# Patient Record
Sex: MALE | Race: WHITE | NOT HISPANIC OR LATINO | Employment: FULL TIME | URBAN - METROPOLITAN AREA
[De-identification: names, ages, dates, MRNs, and addresses within clinical notes are randomized per-mention and may not be internally consistent; named-entity substitution may affect disease eponyms.]

---
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
## 2020-03-16 ENCOUNTER — OFFICE VISIT (OUTPATIENT)
Dept: URGENT CARE | Facility: CLINIC | Age: 20
End: 2020-03-16
Payer: MEDICAID

## 2020-03-16 VITALS — BODY MASS INDEX: 30.45 KG/M2 | WEIGHT: 194 LBS | HEIGHT: 67 IN | RESPIRATION RATE: 16 BRPM

## 2020-03-16 DIAGNOSIS — S91.209A AVULSION OF TOENAIL, INITIAL ENCOUNTER: Primary | ICD-10-CM

## 2020-03-16 PROCEDURE — 99203 OFFICE O/P NEW LOW 30 MIN: CPT | Mod: S$GLB,,, | Performed by: FAMILY MEDICINE

## 2020-03-16 PROCEDURE — 99203 PR OFFICE/OUTPT VISIT, NEW, LEVL III, 30-44 MIN: ICD-10-PCS | Mod: S$GLB,,, | Performed by: FAMILY MEDICINE

## 2020-03-16 RX ORDER — HYDROCODONE BITARTRATE AND ACETAMINOPHEN 5; 325 MG/1; MG/1
1 TABLET ORAL EVERY 6 HOURS PRN
Qty: 15 TABLET | Refills: 0 | Status: SHIPPED | OUTPATIENT
Start: 2020-03-16 | End: 2023-03-10

## 2020-03-16 NOTE — PROCEDURES
Nail Removal  Date/Time: 3/16/2020 12:35 PM  Performed by: George Pitts MD  Authorized by: George Pitts MD     Consent Done?:  Yes (Verbal)    Location:  Left foot  Anesthesia:  Digital block  Anesthetic total (ml):  3  Preparation:  Skin prepped with alcohol    Amount removed:  Complete  Wedge excision of skin of nail fold: No    Nail bed sutured?: No    Nail matrix removed:  None  Dressing applied:  Non-adhesive packing strip, dressing applied and 4x4  Patient tolerance:  Patient tolerated the procedure well with no immediate complications     Pt nail partially avulsed on presentation.

## 2020-03-16 NOTE — PROGRESS NOTES
"Subjective:       Patient ID: Magaly Grimm is a 19 y.o. male.    Vitals:  height is 5' 7" (1.702 m) and weight is 88 kg (194 lb). His respiration is 16.     Chief Complaint: Toe Pain    New problem pt states big toe was hit on door, pt states broken same toe in past    Toe Pain    The incident occurred 1 to 3 hours ago. The incident occurred at home. The injury mechanism was a direct blow. The pain is present in the right foot. The quality of the pain is described as aching. The pain has been constant since onset. Associated symptoms include an inability to bear weight. He reports no foreign bodies present. The symptoms are aggravated by movement and weight bearing. He has tried nothing for the symptoms. The treatment provided no relief.       Constitution: Negative for fatigue.   HENT: Negative for facial swelling and facial trauma.    Neck: Negative for neck stiffness.   Cardiovascular: Negative for chest trauma.   Eyes: Negative for eye trauma, double vision and blurred vision.   Gastrointestinal: Negative for abdominal trauma, abdominal pain and rectal bleeding.   Genitourinary: Negative for hematuria, genital trauma and pelvic pain.   Musculoskeletal: Negative for pain, trauma, joint swelling, abnormal ROM of joint and pain with walking.   Skin: Negative for color change, wound, abrasion and laceration.   Neurological: Negative for dizziness, history of vertigo, light-headedness, coordination disturbances, altered mental status and loss of consciousness.   Hematologic/Lymphatic: Negative for history of bleeding disorder.   Psychiatric/Behavioral: Negative for altered mental status.       Objective:      Physical Exam   Constitutional: He is oriented to person, place, and time. He appears well-developed and well-nourished.   HENT:   Head: Normocephalic and atraumatic.   Nose: Nose normal.   Eyes: Conjunctivae and lids are normal.   Neck: Trachea normal, full passive range of motion without pain and phonation " normal. Neck supple.   Musculoskeletal: Normal range of motion.   Neurological: He is alert and oriented to person, place, and time.   Skin: Skin is warm, dry and intact.   Nail injury- partial avulsion left great toe.    Psychiatric: He has a normal mood and affect. His speech is normal and behavior is normal. Judgment and thought content normal. Cognition and memory are normal.   Nursing note and vitals reviewed.        Assessment:       1. Avulsion of toenail, initial encounter        Plan:         Avulsion of toenail, initial encounter  -     Nail Removal    Other orders  -     HYDROcodone-acetaminophen (NORCO) 5-325 mg per tablet; Take 1 tablet by mouth every 6 (six) hours as needed for Pain.  Dispense: 15 tablet; Refill: 0

## 2020-03-19 ENCOUNTER — TELEPHONE (OUTPATIENT)
Dept: URGENT CARE | Facility: CLINIC | Age: 20
End: 2020-03-19

## 2020-10-08 ENCOUNTER — OFFICE VISIT (OUTPATIENT)
Dept: URGENT CARE | Facility: CLINIC | Age: 20
End: 2020-10-08
Payer: COMMERCIAL
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
## 2020-10-08 DIAGNOSIS — S61.209A FINGER AVULSION, INITIAL ENCOUNTER: ICD-10-CM

## 2020-10-08 DIAGNOSIS — S61.311A LACERATION OF LEFT INDEX FINGER WITH DAMAGE TO NAIL, FOREIGN BODY PRESENCE UNSPECIFIED, INITIAL ENCOUNTER: Primary | ICD-10-CM

## 2020-10-08 PROCEDURE — 12001 LACERATION REPAIR: ICD-10-PCS | Mod: S$GLB,,, | Performed by: FAMILY MEDICINE

## 2020-10-08 PROCEDURE — 73140 XR FINGER 2 OR MORE VIEWS LEFT: ICD-10-PCS | Mod: LT,S$GLB,, | Performed by: RADIOLOGY

## 2020-10-08 PROCEDURE — 99214 PR OFFICE/OUTPT VISIT, EST, LEVL IV, 30-39 MIN: ICD-10-PCS | Mod: 25,S$GLB,, | Performed by: FAMILY MEDICINE

## 2020-10-08 PROCEDURE — 73140 X-RAY EXAM OF FINGER(S): CPT | Mod: LT,S$GLB,, | Performed by: RADIOLOGY

## 2020-10-08 PROCEDURE — 12001 RPR S/N/AX/GEN/TRNK 2.5CM/<: CPT | Mod: S$GLB,,, | Performed by: FAMILY MEDICINE

## 2020-10-08 PROCEDURE — 99214 OFFICE O/P EST MOD 30 MIN: CPT | Mod: 25,S$GLB,, | Performed by: FAMILY MEDICINE

## 2020-10-08 RX ORDER — CEPHALEXIN 500 MG/1
500 CAPSULE ORAL EVERY 12 HOURS
Qty: 20 CAPSULE | Refills: 0 | Status: SHIPPED | OUTPATIENT
Start: 2020-10-08 | End: 2020-10-18

## 2020-10-08 RX ORDER — LISINOPRIL 10 MG/1
10 TABLET ORAL
COMMUNITY
Start: 2019-10-22

## 2020-10-08 RX ORDER — HYDROCODONE BITARTRATE AND ACETAMINOPHEN 5; 325 MG/1; MG/1
1 TABLET ORAL EVERY 6 HOURS PRN
Qty: 10 TABLET | Refills: 0 | Status: SHIPPED | OUTPATIENT
Start: 2020-10-08 | End: 2023-03-10

## 2020-10-08 NOTE — LETTER
[facility]  [address], [address]  [place] [address]  Phone: [phone]  Fax: [phone]  [facility]: [email or web address]    Pt Name: Magaly Grimm  Injury Date: 10/08/2020   Employee ID:  Date of First Treatment: 10/08/2020   Company: [email or web address]      Appointment Time: 12:20 PM Arrived: 1220   Provider: George Pitts MD Time Out:300     Office Treatment:   1. Laceration of left index finger with damage to nail, foreign body presence unspecified, initial encounter    2. Work related injury    3. Finger avulsion, initial encounter      Medications Ordered This Encounter   Medications    cephALEXin (KEFLEX) 500 MG capsule    HYDROcodone-acetaminophen (NORCO) 5-325 mg per tablet      Patient Instructions: Attention not to aggravate affected area, Use splint as directed, Keep dressing clean/dry/covered    Restrictions: Limited use of left hand and arm(keep covered at work at all times)     Return Appointment: 10/15 or sooner if needed

## 2020-10-08 NOTE — PROCEDURES
Laceration Repair    Date/Time: 10/8/2020 12:35 PM  Performed by: George Pitts MD  Authorized by: George Pitts MD   Body area: upper extremity  Location details: left index finger  Laceration length: 1.5 cm  Tendon involvement: none  Nerve involvement: superficial  Vascular damage: yes  Anesthesia: digital block    Anesthesia:  Local Anesthetic: lidocaine 2% without epinephrine  Anesthetic total: 9 mL  Skin closure: glue  Dressing: splint and splint for protection  Patient tolerance: Patient tolerated the procedure well with no immediate complications  Comments: Significant time spent controlling bleeding given size of defect. Initially tried glue but continued to bleed- used bovie then dermabond and hemostasis achieved

## 2020-10-08 NOTE — PROGRESS NOTES
Pt presents to urgent care wc cut to left index finger. Pt states he was cutting green onions when the knife slipped and cut his finger.

## 2020-10-08 NOTE — PROGRESS NOTES
Subjective:       Patient ID: Magaly Grimm is a 19 y.o. male.    Chief Complaint: Hand Injury    Pt presents to urgent care for a w/c initial visit.  He works for [employer].  Pt stated that he was cutting green onions when the knife slipped and cut his left index finger. Pain is 10/10. The incident happened this morning.   Hand Injury   His dominant hand is their left hand. The incident occurred 1 to 3 hours ago. The incident occurred at work. There was no injury mechanism. The pain is present in the left fingers. The pain does not radiate. The pain is at a severity of 10/10. The pain is severe. The pain has been constant since the incident. Pertinent negatives include no chest pain. Nothing aggravates the symptoms. He has tried nothing for the symptoms. The treatment provided no relief.       Constitution: Negative for chills, fatigue and fever.   HENT: Negative for facial swelling, facial trauma, congestion and sore throat.    Neck: Negative for neck stiffness and painful lymph nodes.   Cardiovascular: Negative for chest trauma, chest pain and leg swelling.   Eyes: Negative for eye trauma, double vision and blurred vision.   Respiratory: Negative for cough and shortness of breath.    Gastrointestinal: Negative for abdominal trauma, abdominal pain, nausea, vomiting, diarrhea and rectal bleeding.   Genitourinary: Negative for dysuria, frequency, urgency, hematuria, genital trauma and pelvic pain.   Musculoskeletal: Positive for trauma and joint pain. Negative for pain, joint swelling, pain with walking, muscle cramps and muscle ache.   Skin: Negative for color change, pale, rash, wound, abrasion and laceration.   Allergic/Immunologic: Negative for seasonal allergies.   Neurological: Negative for dizziness, history of vertigo, light-headedness, passing out, coordination disturbances, headaches, altered mental status and loss of consciousness.   Hematologic/Lymphatic: Negative for swollen lymph nodes, easy  bruising/bleeding, history of blood clots and history of bleeding disorder. Does not bruise/bleed easily.   Psychiatric/Behavioral: Negative for altered mental status, nervous/anxious, sleep disturbance and depression. The patient is not nervous/anxious.         Objective:      Physical Exam  Musculoskeletal: Normal range of motion.         General: Tenderness and deformity (approx 1.5cm avulsion to the radial aspect of left 2nd digit with partial nail involvement. ) present.         Assessment:       1. Laceration of left index finger with damage to nail, foreign body presence unspecified, initial encounter    2. Work related injury    3. Finger avulsion, initial encounter        Plan:       Pt mom in room- asked regarding grafting- advised that unlikely but would refer to ortho for consultation. Had reaction to rocephin as a child after uri and ear infection. No anaphylaxis. .   Medications Ordered This Encounter   Medications    cephALEXin (KEFLEX) 500 MG capsule     Sig: Take 1 capsule (500 mg total) by mouth every 12 (twelve) hours. for 10 days     Dispense:  20 capsule     Refill:  0    HYDROcodone-acetaminophen (NORCO) 5-325 mg per tablet     Sig: Take 1 tablet by mouth every 6 (six) hours as needed for Pain.     Dispense:  10 tablet     Refill:  0     Quantity prescribed more than 7 day supply? No     Patient Instructions: Attention not to aggravate affected area, Use splint as directed, Keep dressing clean/dry/covered   Restrictions: Limited use of left hand and arm(keep covered at work at all times)  No follow-ups on file.    X-ray Finger 2 Or More Views Left    Result Date: 10/8/2020  EXAMINATION: XR FINGER 2 OR MORE VIEWS LEFT CLINICAL HISTORY: Laceration without foreign body of left index finger with damage to nail, initial encounter TECHNIQUE: Three view COMPARISON: None FINDINGS: There is soft tissue injury of the left distal index finger.  There is no evidence underlying fracture.     There is soft  tissue injury of the distal left index finger without evidence of bony injury. Electronically signed by: Rhea Lira MD Date:    10/08/2020 Time:    14:15

## 2020-10-10 ENCOUNTER — OFFICE VISIT (OUTPATIENT)
Dept: URGENT CARE | Facility: CLINIC | Age: 20
End: 2020-10-10
Payer: COMMERCIAL

## 2020-10-10 ENCOUNTER — TELEPHONE (OUTPATIENT)
Dept: URGENT CARE | Facility: CLINIC | Age: 20
End: 2020-10-10

## 2020-10-10 VITALS
RESPIRATION RATE: 16 BRPM | SYSTOLIC BLOOD PRESSURE: 112 MMHG | TEMPERATURE: 99 F | HEIGHT: 67 IN | OXYGEN SATURATION: 97 % | DIASTOLIC BLOOD PRESSURE: 74 MMHG | WEIGHT: 205 LBS | HEART RATE: 88 BPM | BODY MASS INDEX: 32.18 KG/M2
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
## 2020-10-10 DIAGNOSIS — S61.209D AVULSION OF FINGER, SUBSEQUENT ENCOUNTER: ICD-10-CM

## 2020-10-10 DIAGNOSIS — S61.311D LACERATION OF LEFT INDEX FINGER WITH DAMAGE TO NAIL, FOREIGN BODY PRESENCE UNSPECIFIED, SUBSEQUENT ENCOUNTER: ICD-10-CM

## 2020-10-10 PROCEDURE — 99214 PR OFFICE/OUTPT VISIT, EST, LEVL IV, 30-39 MIN: ICD-10-PCS | Mod: S$GLB,,, | Performed by: PHYSICIAN ASSISTANT

## 2020-10-10 PROCEDURE — 99214 OFFICE O/P EST MOD 30 MIN: CPT | Mod: S$GLB,,, | Performed by: PHYSICIAN ASSISTANT

## 2020-10-10 RX ORDER — HYDROCODONE BITARTRATE AND ACETAMINOPHEN 5; 325 MG/1; MG/1
1 TABLET ORAL EVERY 6 HOURS PRN
Qty: 10 TABLET | Refills: 0 | Status: SHIPPED | OUTPATIENT
Start: 2020-10-10 | End: 2023-03-10

## 2020-10-10 NOTE — PROGRESS NOTES
Subjective:       Patient ID: Magaly Grimm is a 19 y.o. male.    Chief Complaint: Hand Pain    Patient presents to urgent care for worker's comp follow-up. Patient works at [employer] and cut his left index finger on on 10/08/2020. Patient states that he is still in pain -rates his pain to be a 7/10. Patient has been taking Keflex RX as prescribed and Norco RX and OTC Ibuprofen as well. Patient currently denies numbness/tingling or weakness.     Hand Pain   The incident occurred 2 days ago. The incident occurred at work. The pain is present in the left fingers. The quality of the pain is described as aching. The pain does not radiate. The pain is at a severity of 7/10. The pain is moderate. The pain has been constant since the incident. Pertinent negatives include no chest pain, muscle weakness, numbness or tingling. The symptoms are aggravated by movement and palpation. Treatments tried: Norco, Ibuprofen. The treatment provided mild relief.       Constitution: Negative for chills, sweating, fatigue and fever.   HENT: Negative for ear pain, drooling, congestion, sore throat, trouble swallowing and voice change.    Neck: Negative for neck pain, neck stiffness, painful lymph nodes and neck swelling.   Cardiovascular: Negative for chest pain, leg swelling, palpitations, sob on exertion and passing out.   Eyes: Negative for eye pain, eye redness, double vision, blurred vision and eyelid swelling.   Respiratory: Negative for chest tightness, cough, sputum production, bloody sputum, shortness of breath, stridor and wheezing.    Gastrointestinal: Negative for abdominal pain, abdominal bloating, nausea, vomiting, constipation, diarrhea and heartburn.   Musculoskeletal: Positive for trauma and joint pain. Negative for joint swelling, abnormal ROM of joint, back pain, pain with walking, muscle cramps and muscle ache.   Skin: Negative for rash and hives.   Allergic/Immunologic: Negative for seasonal allergies, hives, itching and  sneezing.   Neurological: Negative for dizziness, light-headedness, passing out, loss of balance, headaches, altered mental status, loss of consciousness, numbness, tingling and seizures.   Hematologic/Lymphatic: Negative for swollen lymph nodes.   Psychiatric/Behavioral: Negative for altered mental status and nervous/anxious. The patient is not nervous/anxious.         Objective:      Physical Exam  Vitals signs and nursing note reviewed.   Constitutional:       General: He is not in acute distress.     Appearance: Normal appearance. He is well-developed. He is not ill-appearing, toxic-appearing or diaphoretic.   HENT:      Head: Normocephalic and atraumatic.      Right Ear: External ear normal.      Left Ear: External ear normal.      Nose: Nose normal.      Mouth/Throat:      Pharynx: Uvula midline. No oropharyngeal exudate or posterior oropharyngeal erythema.   Eyes:      General: No scleral icterus.     Conjunctiva/sclera: Conjunctivae normal.   Neck:      Musculoskeletal: Normal range of motion and neck supple. No edema, erythema or neck rigidity.      Trachea: Trachea and phonation normal.   Cardiovascular:      Rate and Rhythm: Normal rate and regular rhythm.      Heart sounds: Normal heart sounds.   Pulmonary:      Effort: Pulmonary effort is normal. No accessory muscle usage or respiratory distress.      Breath sounds: Normal breath sounds. No stridor. No decreased breath sounds.   Abdominal:      General: Bowel sounds are normal.      Palpations: Abdomen is soft.      Tenderness: There is no abdominal tenderness.   Musculoskeletal: Normal range of motion.         General: No deformity.        Hands:    Skin:     General: Skin is warm and dry.      Capillary Refill: Capillary refill takes less than 2 seconds.      Coloration: Skin is not pale.      Findings: Wound present. No rash.   Neurological:      Mental Status: He is alert and oriented to person, place, and time.      Coordination: Coordination  normal.      Gait: Gait normal.   Psychiatric:         Behavior: Behavior normal. Behavior is cooperative.         Thought Content: Thought content normal.         Judgment: Judgment normal.         L 2nd digit prepped and cleaned with sterile water. Dressing applied with nonstick, coban and splint for protection. Patient tolerated well without complications. NV intact. 2+ cap refill.    Assessment:       1. Work related injury    2. Laceration of left index finger with damage to nail, foreign body presence unspecified, subsequent encounter    3. Avulsion of finger, subsequent encounter        Plan:      reviewed. Discussed case with Dr. Pitts and agreed with patient's treatment plan of care to give same quantity of Norco RX to patient at this time. Advised follow-up with  Occupational Health as needed. Patient reports Ortho appointment on 10/12/20, so advised follow-up as scheduled. Patient aware, verbalized understanding and agreed with plan of care.    Magaly was seen today for hand pain.    Diagnoses and all orders for this visit:    Work related injury    Laceration of left index finger with damage to nail, foreign body presence unspecified, subsequent encounter    Avulsion of finger, subsequent encounter      Patient Instructions   Advised patient to follow-up with Occupational Health as needed.  Follow-up with Ortho as scheduled for 10/12/20.  Norco RX as prescribed for pain as needed.  Advised patient to keep splint for protection until further evaluated with Ortho.  Patient aware, verbalized understanding and agreed with plan of care.                Follow up if symptoms worsen or fail to improve, for Follow-up with Orthopedics.

## 2020-10-10 NOTE — PATIENT INSTRUCTIONS
Advised patient to follow-up with Occupational Health as needed.  Follow-up with Ortho as scheduled for 10/12/20.  Norco RX as prescribed for pain as needed.  Advised patient to keep splint for protection until further evaluated with Ortho.  Patient aware, verbalized understanding and agreed with plan of care.

## 2020-10-10 NOTE — TELEPHONE ENCOUNTER
Patient asking about Norco RX. Discussed case with Dr. Pitts and was told that he needed to be evaluated by a provider first before giving another RX. Patient aware and verbalized understanding.

## 2022-05-05 NOTE — TELEPHONE ENCOUNTER
----- Message from Isabella Hernandez sent at 9/7/2017  1:12 PM CDT -----  Contact: Mother Andre Powell need a school excuse for son to make up work for yesterday. Please fax to [facility] number is [phone]. Any questions please call  Mother Andre at [phone]    
Call placed. Spoke with mother. She states Magaly has been doing well attending school for the full day, however yesterday he had a headache in the 2nd hour and had to be checked out. She would like a school excuse faxed to the number provided at [facility]. Will fax note.  
unk